# Patient Record
(demographics unavailable — no encounter records)

---

## 2024-10-31 NOTE — ASSESSMENT
[FreeTextEntry1] : 32-year-old F with a longstanding Hx of obesity presents for weight management follow-up. 10 lb weight loss since last visit ~1 month ago; on Wegovy 0.25 mg/week (not continuing due to cost). Nutrition and exercise guidelines reviewed with the patient.    Encourage 3 protein-focused meals/day (aim for 70 g protein/day) Avoid skipping meals (i.e. frontload calories earlier in the day) Try to make vegetables the largest portion of each meal, followed by a lean protein (e.g. lentils, chicken, fish), and finally a complex carbohydrate if still hungry (e.g. sweet potato, farro, quinoa)  Increase zero calorie fluid intake (aim for at least 64 oz/day) Exercise with cardio (aim for 8k-10k steps/day) and strength training 2-3x/week Use step counter Weigh yourself 1x-2x/week Try the Calm lizette or Soothing Pod lizette for stress management Follow-up with nutritionist (keep a food log) Increase dose of Metformin to 1000 mg BID; call the office right away with any side effects Pt requesting refill of spironolactone - rx sent  Complete labs (ordered) Follow-up in 6 weeks   All questions answered   Call with any questions or concerns Time before and after visit spent reviewing chart   Additional time spent before and after visit reviewing chart

## 2024-10-31 NOTE — PHYSICAL EXAM
[de-identified] : No visualized masses, JVD or audible bruits noted  [de-identified] : Regular rate, no audible carotid bruits or JVD appreciated  [Normal] : supple without JVD, no thyromegaly or masses appreciated [de-identified] : Well, healthy-appearing adult [de-identified] : EOMI, no conjunctival injection, anicteric  [de-identified] : Equal chest rise, non-labored respirations, no audible wheezing  [de-identified] :  Soft, NT, ND, no diastasis or hernias appreciated [de-identified] : TAWANDA

## 2024-10-31 NOTE — REVIEW OF SYSTEMS
[Patient Intake Form Reviewed] : Patient intake form was reviewed [Diarrhea] : diarrhea [Negative] : Psychiatric [Abdominal Pain] : no abdominal pain [Vomiting] : no vomiting [Constipation] : no constipation [Reflux/Heartburn] : no reflux/ heartburn [Hernia] : no hernia [FreeTextEntry7] : nausea after first Wegovy injection

## 2024-10-31 NOTE — HISTORY OF PRESENT ILLNESS
[de-identified] : 32-year-old F with a longstanding Hx of obesity presents for weight management follow-up. 10 lb weight loss since last visit ~1 month ago; on Wegovy 0.25 mg/week (received sample from office - not continuing due to cost).  Pt reports vomiting after first injection and one episode of diarrhea; reports no abdominal pain, constipation, acid reflux, vision changes. Pt consuming 2 protein-focused meals/day, is trying to drink more water (12-24 oz/day), and is walking 30min/day, doing 10 min of Cardio- and strength-training/week.  Weight at Initial Consult: 190 lbs (last visit 10/2/24 - 201 lbs) Current Weight: 191 lbs Anti-Obesity Medication(s): Wegovy (discontinued phentermine Jan 2024 after psychiatric hospitalization) Start Date: 10/2024 Current Dose: 0.25 mg Side-Effects from Anti-Obesity Medication(s): vomiting after first injection and one episode of diarrhea Obesity Comorbidities: PCOS, HLD Comorbidities Improved/Resolved: n/a History of Bariatric Surgery: no

## 2024-10-31 NOTE — HISTORY OF PRESENT ILLNESS
[de-identified] : 32-year-old F with a longstanding Hx of obesity presents for weight management follow-up. 10 lb weight loss since last visit ~1 month ago; on Wegovy 0.25 mg/week (received sample from office - not continuing due to cost).  Pt reports vomiting after first injection and one episode of diarrhea; reports no abdominal pain, constipation, acid reflux, vision changes. Pt consuming 2 protein-focused meals/day, is trying to drink more water (12-24 oz/day), and is walking 30min/day, doing 10 min of Cardio- and strength-training/week.  Weight at Initial Consult: 190 lbs (last visit 10/2/24 - 201 lbs) Current Weight: 191 lbs Anti-Obesity Medication(s): Wegovy (discontinued phentermine Jan 2024 after psychiatric hospitalization) Start Date: 10/2024 Current Dose: 0.25 mg Side-Effects from Anti-Obesity Medication(s): vomiting after first injection and one episode of diarrhea Obesity Comorbidities: PCOS, HLD Comorbidities Improved/Resolved: n/a History of Bariatric Surgery: no

## 2024-10-31 NOTE — PHYSICAL EXAM
[de-identified] : No visualized masses, JVD or audible bruits noted  [de-identified] : Regular rate, no audible carotid bruits or JVD appreciated  [Normal] : supple without JVD, no thyromegaly or masses appreciated [de-identified] : Well, healthy-appearing adult [de-identified] : EOMI, no conjunctival injection, anicteric  [de-identified] : Equal chest rise, non-labored respirations, no audible wheezing  [de-identified] :  Soft, NT, ND, no diastasis or hernias appreciated [de-identified] : TAWANDA

## 2024-12-11 NOTE — PHYSICAL EXAM
[Normal] : affect appropriate [de-identified] : Well, healthy-appearing adult [de-identified] : EOMI, no conjunctival injection, anicteric [de-identified] : No visualized masses, JVD or audible bruits noted  [de-identified] : Equal chest rise, non-labored respirations, no audible wheezing  [de-identified] : Regular rate, no audible carotid bruits or JVD appreciated  [de-identified] :  Soft, NT, ND, no diastasis or hernias appreciated [de-identified] : TAWANDA

## 2024-12-11 NOTE — HISTORY OF PRESENT ILLNESS
[de-identified] : 32-year-old F with a longstanding Hx of obesity presents for weight management follow-up. Pt currently on metformin as Wegovy was stopped Oct 2024 due to cost of self-pay, as well had mild symptoms of nausea/vomiting and one episode of diarrhea. Weight gain 13lb since last visit, which pt attributes to her antidepressant med Abilify that pt has stopped - waiting for psychiatrist to start alternative.  Reports no reflux, nausea, vomiting, constipation or diarrhea, or visual changes. Consuming 3meals/day. Trying to drink adequate water intake daily. Walking 30min/day, plus 30min cardio. Sleeping well.  Starting weight at initial consult: 190 lbs Current weight at today's visit: 204 lbs  Anti-obesity medication(s): metformin (phentermine discontinued due to psychiatric hospitalization) Start date: Feb 2024 (initially 2022 Current dose: 1000mg bid Side-effects from anti-obesity medication(s): n/a Obesity comorbidities: PCOS, HLD Comorbidities improved/resolved: none History of bariatric surgery: no.

## 2024-12-11 NOTE — HISTORY OF PRESENT ILLNESS
[de-identified] : 32-year-old F with a longstanding Hx of obesity presents for weight management follow-up. Pt currently on metformin as Wegovy was stopped Oct 2024 due to cost of self-pay, as well had mild symptoms of nausea/vomiting and one episode of diarrhea. Weight gain 13lb since last visit, which pt attributes to her antidepressant med Abilify that pt has stopped - waiting for psychiatrist to start alternative.  Reports no reflux, nausea, vomiting, constipation or diarrhea, or visual changes. Consuming 3meals/day. Trying to drink adequate water intake daily. Walking 30min/day, plus 30min cardio. Sleeping well.  Starting weight at initial consult: 190 lbs Current weight at today's visit: 204 lbs  Anti-obesity medication(s): metformin (phentermine discontinued due to psychiatric hospitalization) Start date: Feb 2024 (initially 2022 Current dose: 1000mg bid Side-effects from anti-obesity medication(s): n/a Obesity comorbidities: PCOS, HLD Comorbidities improved/resolved: none History of bariatric surgery: no.

## 2024-12-11 NOTE — ASSESSMENT
[FreeTextEntry1] : 32-year-old F with a longstanding Hx of obesity presents for weight management follow-up. Pt currently on metformin as Wegovy was stopped Oct 2024 due to cost of self-pay, as well had mild symptoms of nausea/vomiting and one episode of diarrhea. Weight gain 13lb since last visit, which pt attributes to her antidepressant med Abilify. Doing well overall.   Reviewed guidelines about nutrition and snacking - protein focus diet with 3 meals/day Discussed food choices and timing of meals with front loading early in the day Avoid processed/refined foods and sugary drinks Emphasized adequate daily zero calorie liquid intake, goal of 64 oz/day Maintain daily exercise regimen incorporating cardio and strength training as tolerated Discussed importance of strength training Exercises reviewed with pt Daily goal 8-10k steps/day   Reiterated to pt the warnings of pregnancy while on anti-obesity medication(s)   - instructed pt to avoid planned pregnancy   - advised pt to stop anti-obesity medications immediately if becomes pregnant   - call the office Pt verbalizes understanding of the above  Pt was seen by nutritionist Clarisse Hansen at today's visit for 15min Will schedule full nutrition visit pending weight check at next office visit Feb Return to office in Feb 2025 Continue metformin 1000mg BID Prescription sent to pt's local pharmacy Fasting labs to be ordered at next visit Instructed pt to call the office sooner for issues/concerns All questions answered     Additional time spent before and after visit reviewing chart

## 2024-12-11 NOTE — PHYSICAL EXAM
[Normal] : affect appropriate [de-identified] : Well, healthy-appearing adult [de-identified] : EOMI, no conjunctival injection, anicteric [de-identified] : No visualized masses, JVD or audible bruits noted  [de-identified] : Equal chest rise, non-labored respirations, no audible wheezing  [de-identified] : Regular rate, no audible carotid bruits or JVD appreciated  [de-identified] :  Soft, NT, ND, no diastasis or hernias appreciated [de-identified] : TAWANDA

## 2025-06-10 NOTE — REVIEW OF SYSTEMS
[Fatigue] : fatigue [Recent Weight Gain (___ Lbs)] : recent [unfilled] ~Ulb weight gain [Recent Weight Loss (___ Lbs)] : recent [unfilled] ~Ulb weight loss [SOB on Exertion] : shortness of breath on exertion [Constipation] : constipation [Bloating] : bloating [Back Pain] : back pain [Depression] : depression [Feeling Weak] : feeling weak [Hot Flashes] : hot flashes [Negative] : Heme/Lymph

## 2025-06-10 NOTE — REVIEW OF SYSTEMS
no abdominal distension/no blood in stool/no burning urination/no chills/no dysuria/no fever/no hematuria/no vomiting [Fatigue] : fatigue [Recent Weight Gain (___ Lbs)] : recent [unfilled] ~Ulb weight gain [Recent Weight Loss (___ Lbs)] : recent [unfilled] ~Ulb weight loss [SOB on Exertion] : shortness of breath on exertion [Constipation] : constipation [Bloating] : bloating [Back Pain] : back pain [Depression] : depression [Feeling Weak] : feeling weak [Hot Flashes] : hot flashes [Negative] : Heme/Lymph

## 2025-06-10 NOTE — HISTORY OF PRESENT ILLNESS
[N] : Patient reports normal menses [Y] : Patient is sexually active [Monogamous (Male Partner)] : is monogamous with a male partner [Patient would like to be screened for STIs] : Patient would like to be screened for STIs [FreeTextEntry1] : 33 yo presents for annual exam and pap smear.  She states she would like to change from birth control pills due to forgetfulness.  Currently thinking about using the patch.   [PapSmeardate] : 02/2024 [LMPDate] : 5/28/25

## 2025-06-10 NOTE — HISTORY OF PRESENT ILLNESS
[N] : Patient reports normal menses [Y] : Patient is sexually active [Monogamous (Male Partner)] : is monogamous with a male partner [Patient would like to be screened for STIs] : Patient would like to be screened for STIs [FreeTextEntry1] : 31 yo presents for annual exam and pap smear.  She states she would like to change from birth control pills due to forgetfulness.  Currently thinking about using the patch.   [PapSmeardate] : 02/2024 [LMPDate] : 5/28/25

## 2025-06-10 NOTE — DISCUSSION/SUMMARY
[FreeTextEntry1] : labs drawn and sent Pap smear Patient has decided to switch to Nuvaring vs the patch RTO in 6 months

## 2025-06-10 NOTE — COUNSELING
[Nutrition/ Exercise/ Weight Management] : nutrition, exercise, weight management [Body Image] : body image [Contraception/ Emergency Contraception/ Safe Sexual Practices] : contraception, emergency contraception, safe sexual practices [STD (testing, results, tx)] : STD (testing, results, tx) [Medication Management] : medication management